# Patient Record
Sex: FEMALE | Race: AMERICAN INDIAN OR ALASKA NATIVE
[De-identification: names, ages, dates, MRNs, and addresses within clinical notes are randomized per-mention and may not be internally consistent; named-entity substitution may affect disease eponyms.]

---

## 2018-04-12 ENCOUNTER — HOSPITAL ENCOUNTER (EMERGENCY)
Dept: HOSPITAL 31 - C.ER | Age: 63
Discharge: HOME | End: 2018-04-12
Payer: MEDICARE

## 2018-04-12 VITALS
HEART RATE: 81 BPM | RESPIRATION RATE: 18 BRPM | SYSTOLIC BLOOD PRESSURE: 138 MMHG | DIASTOLIC BLOOD PRESSURE: 85 MMHG | OXYGEN SATURATION: 97 % | TEMPERATURE: 98.5 F

## 2018-04-12 VITALS — BODY MASS INDEX: 26.6 KG/M2

## 2018-04-12 DIAGNOSIS — F19.10: Primary | ICD-10-CM

## 2018-04-12 DIAGNOSIS — Z65.3: ICD-10-CM

## 2018-04-12 NOTE — C.PDOC
History Of Present Illness


61 y/o female presents to ED escorted by investigators from Drug court. As per 

investigators patient missed court date and needs to be medically cleared to be 

incarcerated. At ED patient states " My son  1 month ago" and complaints of 

depression and leg swell bilaterally "for couple of days". No other physical 

complaints at this time.  


Time Seen by Provider: 18 17:02


Chief Complaint (Nursing): Medical Clearance


History Per: Patient, Other (Investigators)


History/Exam Limitations: no limitations


Onset/Duration Of Symptoms: Days


Current Symptoms Are (Timing): Still Present





Past Medical History


Reviewed: Historical Data, Nursing Documentation, Vital Signs


Vital Signs: 


 Last Vital Signs











Temp  98.5 F   18 16:40


 


Pulse  81   18 16:40


 


Resp  18   18 16:40


 


BP  138/85   18 16:40


 


Pulse Ox  97   18 17:31














- Medical History


PMH: No Chronic Diseases


Surgical History: No Surg Hx





- [facility] Procedures








INJECT/INFUSE NEC (13)








Family History: States: No Known Family Hx





- Social History


Hx Tobacco Use: Yes


Hx Alcohol Use: Yes


Hx Substance Use: Yes





- Immunization History


Hx Tetanus Toxoid Vaccination: No


Hx Influenza Vaccination: No


Hx Pneumococcal Vaccination: No





Review Of Systems


Constitutional: Negative for: Fever, Chills


Cardiovascular: Negative for: Chest Pain


Respiratory: Negative for: Shortness of Breath


Musculoskeletal: Positive for: Leg Pain (Swelling)


Neurological: Negative for: Weakness, Numbness


Psych: Positive for: Depression.  Negative for: Suicidal ideation





Physical Exam





- Physical Exam


Appears: Non-toxic, Other (Emotional, Actively crying)


Skin: Warm, Dry, No Rash


Head: Atraumatic, Normacephalic


Eye(s): bilateral: Normal Inspection


Oral Mucosa: Moist


Neck: Normal ROM, Supple


Cardiovascular: Rhythm Regular, No Friction Rub, No Murmur


Respiratory: Normal Breath Sounds, No Rales, No Rhonchi, No Wheezing


Gastrointestinal/Abdominal: Soft, No Tenderness, No Guarding, No Rebound


Extremity: No Tenderness, Pedal Edema (Trace bilaterally), No Deformity, No 

Swelling


Pulses: Left Dorsalis Pedis: Normal, Right Dorsalis Pedis: Normal


Neurological/Psych: Oriented x3, Normal Speech, Normal Cognition, Normal Motor


Gait: Steady





ED Course And Treatment


O2 Sat by Pulse Oximetry: 97 (ra)


Pulse Ox Interpretation: Normal





Medical Decision Making


Medical Decision Making: 


The patient was evaluated by the crisis team and patient was cleared by psych. 





Disposition





- Disposition


Referrals: 


Shilpa Kearns MD [Staff Provider] - 


Disposition: HOME/ ROUTINE


Disposition Time: 17:15


Condition: GOOD


Additional Instructions: 


Patient is medically and psychiatrically cleared for incarceration. 


Instructions:  Dealing With Death, Adult, Polysubstance Abuse


Forms:  [employer] (English)





- Clinical Impression


Clinical Impression: 


 Narcotic abuse








- PA / NP / Resident Statement


MD/DO has reviewed & agrees with the documentation as recorded.





- Scribe Statement


The provider has reviewed the documentation as recorded by the Manjinderibmelody Romero





All medical record entries made by the Manjinderibmelody were at my direction and 

personally dictated by me. I have reviewed the chart and agree that the record 

accurately reflects my personal performance of the history, physical exam, 

medical decision making, and the department course for this patient. I have 

also personally directed, reviewed, and agree with the discharge instructions 

and disposition.